# Patient Record
Sex: MALE | Race: WHITE | Employment: FULL TIME | URBAN - METROPOLITAN AREA
[De-identification: names, ages, dates, MRNs, and addresses within clinical notes are randomized per-mention and may not be internally consistent; named-entity substitution may affect disease eponyms.]

---

## 2022-12-23 ENCOUNTER — APPOINTMENT (OUTPATIENT)
Dept: GENERAL RADIOLOGY | Facility: CLINIC | Age: 57
End: 2022-12-23
Payer: COMMERCIAL

## 2022-12-23 ENCOUNTER — HOSPITAL ENCOUNTER (EMERGENCY)
Facility: CLINIC | Age: 57
Discharge: ANOTHER HOSPITAL | End: 2022-12-23
Attending: EMERGENCY MEDICINE
Payer: COMMERCIAL

## 2022-12-23 VITALS
BODY MASS INDEX: 26.95 KG/M2 | RESPIRATION RATE: 16 BRPM | WEIGHT: 210 LBS | OXYGEN SATURATION: 97 % | SYSTOLIC BLOOD PRESSURE: 131 MMHG | HEART RATE: 97 BPM | HEIGHT: 74 IN | DIASTOLIC BLOOD PRESSURE: 104 MMHG | TEMPERATURE: 97.8 F

## 2022-12-23 DIAGNOSIS — S82.892A CLOSED FRACTURE OF LEFT ANKLE, INITIAL ENCOUNTER: Primary | ICD-10-CM

## 2022-12-23 PROCEDURE — 99285 EMERGENCY DEPT VISIT HI MDM: CPT

## 2022-12-23 PROCEDURE — 6370000000 HC RX 637 (ALT 250 FOR IP): Performed by: EMERGENCY MEDICINE

## 2022-12-23 PROCEDURE — 73610 X-RAY EXAM OF ANKLE: CPT

## 2022-12-23 RX ORDER — OXYCODONE HYDROCHLORIDE AND ACETAMINOPHEN 5; 325 MG/1; MG/1
1 TABLET ORAL ONCE
Status: COMPLETED | OUTPATIENT
Start: 2022-12-23 | End: 2022-12-23

## 2022-12-23 RX ADMIN — OXYCODONE AND ACETAMINOPHEN 1 TABLET: 5; 325 TABLET ORAL at 23:07

## 2022-12-23 ASSESSMENT — ENCOUNTER SYMPTOMS
GASTROINTESTINAL NEGATIVE: 1
EYES NEGATIVE: 1
RESPIRATORY NEGATIVE: 1
ALLERGIC/IMMUNOLOGIC NEGATIVE: 1

## 2022-12-24 NOTE — ED PROVIDER NOTES
eMERGENCY dEPARTMENT eNCOUnter      Pt Name: Carlos Easton  MRN: [id number]  [place] 1965  Date of evaluation: 12/23/2022      CHIEF COMPLAINT       Chief Complaint   Patient presents with    Ankle Pain          HISTORY OF PRESENT ILLNESS    Carlos Easton is a 59 y.o. male who presents to department for evaluation of an injury sustained to his left ankle when he slipped on some ice. Got swelling to both the lateral and the medial malleolus. He cannot walk on it. Rates his pain probably about a 7 out of 10 no palliative nor provocative and  he has taken some Tylenol. REVIEW OF SYSTEMS     Review of Systems   Constitutional: Negative. HENT: Negative. Eyes: Negative. Respiratory: Negative. Cardiovascular: Negative. Gastrointestinal: Negative. Endocrine: Negative. Genitourinary: Negative. Musculoskeletal:  Positive for joint swelling. Skin: Negative. Allergic/Immunologic: Negative. Neurological: Negative. Hematological: Negative. Psychiatric/Behavioral:  Negative for agitation and dysphoric mood. PAST MEDICAL HISTORY    has no past medical history on file. SURGICAL HISTORY      has no past surgical history on file. CURRENT MEDICATIONS       Previous Medications    No medications on file       ALLERGIES     has No Known Allergies. FAMILY HISTORY     has no family status information on file. family history is not on file. SOCIAL HISTORY          PHYSICAL EXAM     INITIAL VITALS:  height is 6' 2\" (1.88 m) and weight is 95.3 kg (210 lb). His oral temperature is 97.8 °F (36.6 °C). His blood pressure is 131/104 (abnormal) and his pulse is 97. His respiration is 16 and oxygen saturation is 97%.      Constitutional: Alert, oriented x3, nontoxic, afebrile, answering questions appropriately, acting properly for age, in no acute distress  HEENT: Extraocular muscles intact, mucus membranes moist, TMs clear bilaterally, no posterior pharyngeal erythema or exudates, Pupils equal, round, reactive to light,   Neck: Trachea midline, Supple without lymphadenopathy, no posterior midline neck tenderness to palpation  Cardiovascular: Regular rhythm and rate no S3, S4, or murmurs  Respiratory: Clear to auscultation bilaterally no wheezes, rhonchi, rales, no respiratory distress  Gastrointestinal: Soft, nontender, nondistended, positive bowel sounds. No rebound, rigidity, or guarding. Musculoskeletal: Examination of the left ankle reveals swelling and tenderness both the lateral and medial malleolus. Good dorsalis pedis pulses present. Patient has good range of motion of his toes range of motion of the ankle is decreased secondary to swelling and pain. Sensation is intact  Neurologic: Moving all 4 extremities without difficulty there are no gross focal neurologic deficits  Skin: Warm and dry    DIFFERENTIAL DIAGNOSIS/ MDM:     Ankle sprainversus fracture  DIAGNOSTIC RESULTS     EKG: All EKG's are interpreted by the Emergency Department Physician who either signs or Co-signs this chart in the absence of a cardiologist.        Not indicated unless otherwise documented above    LABS:  No results found for this visit on 12/23/22. Not indicated unless otherwise documented above    RADIOLOGY:   I reviewed the radiologist interpretations:  XR ANKLE LEFT (MIN 3 VIEWS)    (Results Pending)       Not indicated unless otherwise documented above    EMERGENCY DEPARTMENT COURSE:     The patient was given the following medications:  No orders of the defined types were placed in this encounter.        Vitals:    Vitals:    12/23/22 2035   BP: (!) 131/104   Pulse: 97   Resp: 16   Temp: 97.8 °F (36.6 °C)   TempSrc: Oral   SpO2: 97%   Weight: 95.3 kg (210 lb)   Height: 6' 2\" (1.88 m)     -------------------------  BP (!) 131/104   Pulse 97   Temp 97.8 °F (36.6 °C) (Oral)   Resp 16   Ht 6' 2\" (1.88 m)   Wt 95.3 kg (210 lb)   SpO2 97%   BMI 26.96 kg/m²         I have reviewed the disposition diagnosis with the patient and or their family/guardian. I have answered their questions and given discharge instructions. They voiced understanding of these instructions and did not have any further questions or complaints. CRITICAL CARE:    None    CONSULTS:    None    PROCEDURES:    Splint application: Posterior mold was applied by myself and nursing. Dorsalis pedis pulse was palpable- patient was able to move toes both before and after procedure. Sensation is still intact. OARRS Report if indicated             FINAL IMPRESSION    No diagnosis found. DISPOSITION/PLAN   DISPOSITION     I have reviewed the disposition diagnosis with the patient and or their family/guardian. I have answered their questions and given discharge instructions. They voiced understanding of these instructions and did not have any further questions or complaints. Reevaluation: Patient's x-ray is consistent with a fracture dislocation of his left ankle with a widening of the ankle mortise. Patient will need to be attended to by by an orthopedic physician. We have talked to Dr. Lisa Garvin over at [facility] in the ER-they are excepting the patient there and they will consult orthopedics when the patient arrives. Patient has been placed in a posterior mold splint. The patient and/or guardian has normal mental status and adequate capacity to make medical decisions. The patient and/or guardian had the opportunity to ask questions about his medical condition. The patient was advised to be transported via ambulance for transfer. The patient and/or guardian refuses ambulance transport and wishes to be transported via auto. The patient and/or guardian was able to understand the risks and benefits of transport. The risks have been explained to the patient and/or guardian, including worsening illness, permanent disability and death.   The benefits of ambulance transport have also been explained, including the availability of appropriate equipment and appropriate staff for stabilization and transport. All relevant records were sent with the patient for transfer. He was advised to be taken directly [place] [facility] for [place]. PATIENT REFERRED TO:  No follow-up provider specified.     DISCHARGE MEDICATIONS:  New Prescriptions    No medications on file       (Please note that portions of this note were completed with a voice recognition program.  Efforts were made to edit the dictations but occasionally words are mis-transcribed.)    Malika Dietz MD,  Attending Emergency Physician           Malika Dietz MD  12/23/22 Isaura. Manjinder Durant III, MD  12/23/22 Isaura. Manjinder Durant III, MD  12/23/22 [address], [place]  01/07/23 0208

## 2022-12-24 NOTE — ED NOTES
Transport ETA 0600 tomorrow. Pt will go to [facility] by private auto.       Osvaldo Chávez RN  12/23/22 [id number]

## 2022-12-24 NOTE — DISCHARGE INSTRUCTIONS
You have been placed in a splint and you are going to be transferred to orthopedics over at [facility] [place].  There they will relocate your ankle and put you in a permanent splint. This may have to be done in an operating room. Make sure you follow-up with orthopedics as they are directed and return back to the emergency setting if worsening in any way.

## 2022-12-24 NOTE — ED NOTES
Pt accepted at [facility] ED      Julienne Johnson RN  12/23/22 [id number]